# Patient Record
Sex: FEMALE | Race: WHITE | Employment: OTHER | URBAN - METROPOLITAN AREA
[De-identification: names, ages, dates, MRNs, and addresses within clinical notes are randomized per-mention and may not be internally consistent; named-entity substitution may affect disease eponyms.]

---

## 2020-04-07 PROBLEM — I10 ESSENTIAL HYPERTENSION: Status: ACTIVE | Noted: 2020-04-07

## 2020-04-07 PROBLEM — E78.49 OTHER HYPERLIPIDEMIA: Status: ACTIVE | Noted: 2020-04-07

## 2020-07-22 PROBLEM — Z01.810 PREOP CARDIOVASCULAR EXAM: Status: ACTIVE | Noted: 2020-07-22

## 2020-07-22 PROBLEM — F17.200 CURRENT EVERY DAY SMOKER: Status: ACTIVE | Noted: 2020-07-22

## 2020-08-06 PROBLEM — R94.39 ABNORMAL STRESS ECHO: Status: ACTIVE | Noted: 2020-08-06

## 2021-08-10 PROBLEM — E83.52 HYPERCALCEMIA: Status: ACTIVE | Noted: 2021-08-10

## 2021-08-10 PROBLEM — E55.9 VITAMIN D DEFICIENCY: Status: ACTIVE | Noted: 2021-08-10

## 2021-08-10 PROBLEM — F17.200 TOBACCO DEPENDENCE: Status: ACTIVE | Noted: 2020-07-22

## 2021-10-04 PROBLEM — E04.9 NONTOXIC NODULAR GOITER: Status: ACTIVE | Noted: 2021-10-04

## 2021-10-04 PROBLEM — E21.0 PRIMARY HYPERPARATHYROIDISM (HCC): Status: ACTIVE | Noted: 2021-10-04

## 2022-03-01 PROBLEM — I48.0 PAF (PAROXYSMAL ATRIAL FIBRILLATION) (HCC): Status: ACTIVE | Noted: 2022-03-01

## 2022-03-03 ENCOUNTER — ANESTHESIA EVENT (OUTPATIENT)
Dept: SURGERY | Facility: HOSPITAL | Age: 67
End: 2022-03-03
Payer: MEDICARE

## 2022-03-05 ENCOUNTER — LAB ENCOUNTER (OUTPATIENT)
Dept: LAB | Age: 67
End: 2022-03-05
Attending: SURGERY
Payer: MEDICARE

## 2022-03-05 DIAGNOSIS — Z01.812 ENCOUNTER FOR PREOPERATIVE SCREENING LABORATORY TESTING FOR COVID-19 VIRUS: ICD-10-CM

## 2022-03-05 DIAGNOSIS — Z01.818 PRE-OP TESTING: ICD-10-CM

## 2022-03-05 DIAGNOSIS — E21.3 HYPERPARATHYROIDISM (HCC): ICD-10-CM

## 2022-03-05 DIAGNOSIS — I48.0 PAF (PAROXYSMAL ATRIAL FIBRILLATION) (HCC): ICD-10-CM

## 2022-03-05 DIAGNOSIS — Z20.822 ENCOUNTER FOR PREOPERATIVE SCREENING LABORATORY TESTING FOR COVID-19 VIRUS: ICD-10-CM

## 2022-03-06 LAB — SARS-COV-2 RNA RESP QL NAA+PROBE: NOT DETECTED

## 2022-03-08 ENCOUNTER — HOSPITAL ENCOUNTER (OUTPATIENT)
Facility: HOSPITAL | Age: 67
Setting detail: HOSPITAL OUTPATIENT SURGERY
Discharge: HOME | End: 2022-03-08
Attending: SURGERY | Admitting: SURGERY
Payer: MEDICARE

## 2022-03-08 ENCOUNTER — ANESTHESIA (OUTPATIENT)
Dept: SURGERY | Facility: HOSPITAL | Age: 67
End: 2022-03-08
Payer: MEDICARE

## 2022-03-08 VITALS
DIASTOLIC BLOOD PRESSURE: 61 MMHG | SYSTOLIC BLOOD PRESSURE: 115 MMHG | TEMPERATURE: 98 F | HEART RATE: 65 BPM | BODY MASS INDEX: 26.18 KG/M2 | WEIGHT: 140.44 LBS | HEIGHT: 61.5 IN | OXYGEN SATURATION: 97 % | RESPIRATION RATE: 18 BRPM

## 2022-03-08 DIAGNOSIS — Z01.818 PRE-OP TESTING: ICD-10-CM

## 2022-03-08 DIAGNOSIS — Z01.812 ENCOUNTER FOR PREOPERATIVE SCREENING LABORATORY TESTING FOR COVID-19 VIRUS: ICD-10-CM

## 2022-03-08 DIAGNOSIS — Z20.822 ENCOUNTER FOR PREOPERATIVE SCREENING LABORATORY TESTING FOR COVID-19 VIRUS: ICD-10-CM

## 2022-03-08 DIAGNOSIS — I48.0 PAF (PAROXYSMAL ATRIAL FIBRILLATION) (HCC): Primary | ICD-10-CM

## 2022-03-08 DIAGNOSIS — E21.3 HYPERPARATHYROIDISM (HCC): ICD-10-CM

## 2022-03-08 LAB
PTH-INTACT SERPL-MCNC: 269.7 PG/ML
PTH-INTACT SERPL-MCNC: 69.2 PG/ML

## 2022-03-08 PROCEDURE — 0GTP0ZZ RESECTION OF LEFT INFERIOR PARATHYROID GLAND, OPEN APPROACH: ICD-10-PCS | Performed by: SURGERY

## 2022-03-08 PROCEDURE — 88305 TISSUE EXAM BY PATHOLOGIST: CPT | Performed by: SURGERY

## 2022-03-08 PROCEDURE — 88331 PATH CONSLTJ SURG 1 BLK 1SPC: CPT | Performed by: SURGERY

## 2022-03-08 PROCEDURE — 83970 ASSAY OF PARATHORMONE: CPT | Performed by: SURGERY

## 2022-03-08 RX ORDER — HYDROMORPHONE HYDROCHLORIDE 1 MG/ML
0.5 INJECTION, SOLUTION INTRAMUSCULAR; INTRAVENOUS; SUBCUTANEOUS EVERY 5 MIN PRN
Status: DISCONTINUED | OUTPATIENT
Start: 2022-03-08 | End: 2022-03-08

## 2022-03-08 RX ORDER — TRAMADOL HYDROCHLORIDE 50 MG/1
TABLET ORAL EVERY 6 HOURS PRN
Qty: 20 TABLET | Refills: 0 | Status: SHIPPED | OUTPATIENT
Start: 2022-03-08

## 2022-03-08 RX ORDER — MEPERIDINE HYDROCHLORIDE 25 MG/ML
25 INJECTION INTRAMUSCULAR; INTRAVENOUS; SUBCUTANEOUS
Status: DISCONTINUED | OUTPATIENT
Start: 2022-03-08 | End: 2022-03-08

## 2022-03-08 RX ORDER — SODIUM CHLORIDE, SODIUM LACTATE, POTASSIUM CHLORIDE, CALCIUM CHLORIDE 600; 310; 30; 20 MG/100ML; MG/100ML; MG/100ML; MG/100ML
INJECTION, SOLUTION INTRAVENOUS CONTINUOUS
Status: DISCONTINUED | OUTPATIENT
Start: 2022-03-08 | End: 2022-03-08

## 2022-03-08 RX ORDER — ONDANSETRON 2 MG/ML
4 INJECTION INTRAMUSCULAR; INTRAVENOUS AS NEEDED
Status: DISCONTINUED | OUTPATIENT
Start: 2022-03-08 | End: 2022-03-08

## 2022-03-08 RX ORDER — MIDAZOLAM HYDROCHLORIDE 1 MG/ML
1 INJECTION INTRAMUSCULAR; INTRAVENOUS EVERY 5 MIN PRN
Status: DISCONTINUED | OUTPATIENT
Start: 2022-03-08 | End: 2022-03-08

## 2022-03-08 RX ORDER — METOCLOPRAMIDE HYDROCHLORIDE 5 MG/ML
INJECTION INTRAMUSCULAR; INTRAVENOUS AS NEEDED
Status: DISCONTINUED | OUTPATIENT
Start: 2022-03-08 | End: 2022-03-08 | Stop reason: SURG

## 2022-03-08 RX ORDER — ACETAMINOPHEN 500 MG
1000 TABLET ORAL ONCE AS NEEDED
Status: DISCONTINUED | OUTPATIENT
Start: 2022-03-08 | End: 2022-03-08

## 2022-03-08 RX ORDER — BUPIVACAINE HYDROCHLORIDE 5 MG/ML
INJECTION, SOLUTION EPIDURAL; INTRACAUDAL AS NEEDED
Status: DISCONTINUED | OUTPATIENT
Start: 2022-03-08 | End: 2022-03-08 | Stop reason: HOSPADM

## 2022-03-08 RX ORDER — HYDROCODONE BITARTRATE AND ACETAMINOPHEN 5; 325 MG/1; MG/1
2 TABLET ORAL AS NEEDED
Status: DISCONTINUED | OUTPATIENT
Start: 2022-03-08 | End: 2022-03-08

## 2022-03-08 RX ORDER — DEXAMETHASONE SODIUM PHOSPHATE 4 MG/ML
VIAL (ML) INJECTION AS NEEDED
Status: DISCONTINUED | OUTPATIENT
Start: 2022-03-08 | End: 2022-03-08 | Stop reason: SURG

## 2022-03-08 RX ORDER — NALOXONE HYDROCHLORIDE 0.4 MG/ML
80 INJECTION, SOLUTION INTRAMUSCULAR; INTRAVENOUS; SUBCUTANEOUS AS NEEDED
Status: DISCONTINUED | OUTPATIENT
Start: 2022-03-08 | End: 2022-03-08

## 2022-03-08 RX ORDER — MIDAZOLAM HYDROCHLORIDE 1 MG/ML
INJECTION INTRAMUSCULAR; INTRAVENOUS AS NEEDED
Status: DISCONTINUED | OUTPATIENT
Start: 2022-03-08 | End: 2022-03-08 | Stop reason: SURG

## 2022-03-08 RX ORDER — HYDROCODONE BITARTRATE AND ACETAMINOPHEN 5; 325 MG/1; MG/1
1 TABLET ORAL AS NEEDED
Status: DISCONTINUED | OUTPATIENT
Start: 2022-03-08 | End: 2022-03-08

## 2022-03-08 RX ORDER — ACETAMINOPHEN 500 MG
1000 TABLET ORAL ONCE
Status: DISCONTINUED | OUTPATIENT
Start: 2022-03-08 | End: 2022-03-08 | Stop reason: HOSPADM

## 2022-03-08 RX ADMIN — DEXAMETHASONE SODIUM PHOSPHATE 4 MG: 4 MG/ML VIAL (ML) INJECTION at 13:51:00

## 2022-03-08 RX ADMIN — SODIUM CHLORIDE, SODIUM LACTATE, POTASSIUM CHLORIDE, CALCIUM CHLORIDE: 600; 310; 30; 20 INJECTION, SOLUTION INTRAVENOUS at 13:56:00

## 2022-03-08 RX ADMIN — MIDAZOLAM HYDROCHLORIDE 2 MG: 1 INJECTION INTRAMUSCULAR; INTRAVENOUS at 12:29:00

## 2022-03-08 RX ADMIN — METOCLOPRAMIDE HYDROCHLORIDE 10 MG: 5 INJECTION INTRAMUSCULAR; INTRAVENOUS at 12:55:00

## 2022-03-08 NOTE — ANESTHESIA POSTPROCEDURE EVALUATION
[facility] Patient Status:  Hospital Outpatient Surgery   Age/Gender 74year old female MRN [id number]   Location [facility] Attending Calxito Yoder MD   Hosp Day # 0 PCP Pao Gutierrez MD       Anesthesia Post-op Note    PARATHYROIDECTOMY WITH INTRAOPERATIVE ULTRASOUND, INTACT PARATHORMONE ASSAY    Procedure Summary     Date: 03/08/22 Room / Location: [facility] MAIN OR 03 / [facility] MAIN OR    Anesthesia Start: 0966 Anesthesia Stop: 6154    Procedure: PARATHYROIDECTOMY WITH INTRAOPERATIVE ULTRASOUND, INTACT PARATHORMONE ASSAY (N/A ) Diagnosis:       Hyperparathyroidism ([address]      (Hyperparathyroidism ([address] [E21.3])    Surgeons: Calixto Yoder MD Anesthesiologist: Jeff Rose MD    Anesthesia Type: general ASA Status: 3          Anesthesia Type: general    Vitals Value Taken Time   BP 86/54 03/08/22 1357   Temp 97.9 03/08/22 1357   Pulse 75 03/08/22 1357   Resp 12 03/08/22 1357   SpO2 99 03/08/22 1357       Patient Location: PACU    Anesthesia Type: general    Airway Patency: extubated    Postop Pain Control: adequate    Mental Status: mildly sedated but able to meaningfully participate in the post-anesthesia evaluation    Nausea/Vomiting: none    Cardiopulmonary/Hydration status: stable euvolemic    Complications: no apparent anesthesia related complications    Postop vital signs: stable    Dental Exam: Unchanged from Preop    Patient to be discharged from PACU when criteria met.

## 2022-03-08 NOTE — ANESTHESIA PROCEDURE NOTES
Airway  Date/Time: 3/8/2022 12:32 PM  Urgency: elective      General Information and Staff    Patient location during procedure: OR  Anesthesiologist: Carolyn Dukes MD  Performed: anesthesiologist     Indications and Patient Condition  Indications for airway management: anesthesia  Spontaneous Ventilation: absent  Sedation level: deep  Preoxygenated: yes  Patient position: sniffing  Mask difficulty assessment: 1 - vent by mask    Final Airway Details  Final airway type: endotracheal airway      Successful airway: ETT  Cuffed: yes   Successful intubation technique: direct laryngoscopy  Endotracheal tube insertion site: oral  Blade size: #3  ETT size (mm): 7.0    Cormack-Lehane Classification: grade I - full view of glottis  Placement verified by: chest auscultation and capnometry   Cuff volume (mL): 8  Measured from: teeth  ETT to teeth (cm): 20  Number of attempts at approach: 1  Ventilation between attempts: none  Number of other approaches attempted: 0

## 2022-03-08 NOTE — HISTORICAL OFFICE NOTE
The above referenced H&P in the office by Dr. Isiah Tijerina on 3/1/2022 was reviewed by Alejandra Kirk MD on 3/8/2022, the patient was examined and no significant changes have occurred in the patient's condition since the H&P was performed. Risks and benefits were discussed, proceed with procedure as planned.

## 2022-03-08 NOTE — ANESTHESIA PROCEDURE NOTES
Peripheral IV  Date/Time: 3/8/2022 12:39 PM  Inserted by:  Reed Dukes MD    Placement  Needle size: 25 G  Laterality: left  Location: foot  Local anesthetic: none  Site prep: alcohol  Technique: anatomical landmarks  Attempts: 2

## 2022-03-08 NOTE — BRIEF OP NOTE
Pre-Operative Diagnosis: Hyperparathyroidism ([facility] [address] [E21.3]     Post-Operative Diagnosis:  Same as above     Procedure Performed:   PARATHYROIDECTOMY WITH INTRAOPERATIVE ULTRASOUND, INTACT PARATHORMONE ASSAY    Surgeon(s) and Role:     Gerome Lombard, MD - Primary    Assistant(s):  Surgical Assistant.: Ericka Vo, MARY     Surgical Findings: left inferior - hypercellular; 74% drop in intact PTH     Component      Latest Ref Rng & Units 3/8/2022 3/8/2022           1:04 PM 12:46 PM   PTH INTACT      pg/mL 69.2 269.7     Specimen: parathyroid gland     Estimated Blood Loss: 2 cc    Dictation Number:  Wesly Frazier MD  3/8/2022  1:13 PM

## 2022-03-09 NOTE — OPERATIVE REPORT
[facility]    PATIENT'S NAME: Daniel BERG   ATTENDING PHYSICIAN: Kervin Abarca M.D. OPERATING PHYSICIAN: Kervin Abarca M.D. PATIENT ACCOUNT#:   [de-identified]    LOCATION:  [facility] 4 ED 10  MEDICAL RECORD #:   [id number]       YOB: 1955  ADMISSION DATE:       03/08/2022      OPERATION DATE:  03/08/2022    OPERATIVE REPORT      PREOPERATIVE DIAGNOSIS:  Hyperparathyroidism. POSTOPERATIVE DIAGNOSIS:  Hyperparathyroidism. PROCEDURE:  Parathyroidectomy with intraoperative ultrasound, intact PTH assay, and intraoperative frozen section. ASSISTANT:  MARY Carbajal     ANESTHESIA:  General.    ESTIMATED BLOOD LOSS:  2 mL. SPECIMEN:  Parathyroid gland. DISPOSITION:  To PACU. COMPLICATIONS:  None. INDICATIONS:  Patient is a 67-year-old female, who was found to have an elevated serum calcium level on a routine blood test, and subsequently intact PTH was checked and it was also elevated. She noticed tiredness, nausea and abdominal pain, and some bone pain. No history of kidney stones. She has frequent urination, but also she takes Lasix. She has occasional constipation but no memory issues. Her highest calcium level was 12, and a 24-hour urine calcium was normal.  Her ionized calcium was 6.6, and intact PTH level the highest was 154, with vitamin D level of 28. Ultrasound was done in the office, able to visualize the left posterior parathyroid gland measuring about 1.4 cm. She had a 4D CT scan which also localized the gland on the left side. She had a DEXA scan that was normal, and based on these findings, discussed with the patient undergoing a parathyroidectomy. The risks and benefits of surgery were discussed, and patient agreed for the procedure, signed the informed consent. FINDINGS:  Left inferior parathyroid gland hypercellular on frozen section. PTH levels 269.7, 69.2, and a 74% drop in the intact PTH level.       OPERATIVE TECHNIQUE: Patient was brought to the operating room, was placed in supine position on the operating table. Lower extremity SCD boots were placed. After IV sedation and endotracheal intubation, patient's arms tucked at the side and roll was placed under shoulder blade, neck was slightly hyperextended. Then, ultrasound was done and again was able to visualize the parathyroid gland. Then, the area was prepped and draped into a sterile field. Lower Kocher neck incision was made with a 15-blade knife. Electric Bovie cautery was used to separate the subcutaneous tissue, and the superior and inferior flaps raised. Deep cervical fascia was identified, a midline incision was made, and then the left strap muscle was carefully dissected from the thyroid capsule. As the thyroid gland was freed off from the strap muscles inferior and posterior to the gland, the parathyroid gland was identified. It was carefully dissected, elevated, and the blood vessel was ligated using a 3-0 silk tie. The gland was removed. There were only 2 samples, pre-incision and pre-excision of the gland. The time was too close to draw 2 levels, and so 1 level was drawn. Then, 10 minutes postexcision of the gland, there was a 74% drop in the intact PTH level. The gland was confirmed as a hypercellular gland. At this time, the area was inspected. There was no evidence of any bleeding. Then, a piece of Surgicel laid, and a 3-0 Vicryl was used to close the deep cervical dermal layer. Local anesthetic was injected into surrounding tissue, and a 4-0 Monocryl for the skin placing a subcuticular stitch. Incision was then covered with Steri-Strips, 4 x 4, and tape. The patient tolerated the procedure well, was extubated in the operating room, and transferred to PACU in stable condition. At the end of the case, needle, instrument, and sponge counts were all correct.      Dictated By Jonn Carcamo M.D.  d: 03/08/2022 14:03:44  t: 03/08/2022 18:22:02  [facility] [id number]  ALVAREZ/

## (undated) DEVICE — SUTURE MONOCRYL 4-0 PS-2

## (undated) DEVICE — ABSORBABLE HEMOSTAT (OXIDIZED REGENERATED CELLULOSE, U.S.P.): Brand: SURGICEL

## (undated) DEVICE — SUTURE VICRYL 3-0 SH

## (undated) DEVICE — SUTURE SILK 3-0

## (undated) DEVICE — 3M(TM) MICROPORE TAPE DISPENSER 1535-2: Brand: 3M™ MICROPORE™

## (undated) DEVICE — SCD SLEEVE KNEE HI BLEND

## (undated) DEVICE — SOL  .9 1000ML BTL

## (undated) DEVICE — STERILE SYNTHETIC POLYISOPRENE POWDER-FREE SURGICAL GLOVES WITH HYDROGEL COATING: Brand: PROTEXIS

## (undated) DEVICE — 3M™ STERI-STRIP™ REINFORCED ADHESIVE SKIN CLOSURES, R1547, 1/2 IN X 4 IN (12 MM X 100 MM), 6 STRIPS/ENVELOPE: Brand: 3M™ STERI-STRIP™

## (undated) DEVICE — THYROID: Brand: MEDLINE INDUSTRIES, INC.